# Patient Record
(demographics unavailable — no encounter records)

---

## 2019-01-01 NOTE — DSES
DATE OF ADMISSION:  2019

DATE OF DISCHARGE:  2019

 

DISCHARGE DIAGNOSIS:  Full-term girl.

 

HISTORY:  Skinny Liu is a full-term, according to gestational age, baby girl born

by spontaneous vaginal delivery to a 35-year-old mother  2, para 1.

Maternal blood type was B+.  Culture for group B strep were negative.  Serology

for syphilis and hepatitis B were both negative.  There was no maternal history

of herpes.  Membranes were ruptured for 11 hours.  Amniotic fluid was stained

with meconium.  Delivery was otherwise uneventful.  Apgar were 8 an d9.

 

PHYSICAL EXAMINATION:

Birth weight 3000 grams which is 6 pounds 10 ounces.  Head circumference 33 cm,

length 19-1/2 inches.

General Appearance: Alert and responsive, no apparent distress.

Skin:  Well perfused with no rash.

HEENT: Normocephalic.  Anterior fontanelle open and flat.  Eyes were normal with

bilateral red reflex.  No cleft palate.

Neck:  Supple.  No masses.

No thoracic deformities.  Good air entry in both lungs.  No rales.

Heart:  Sounds are rhythmic.  No murmurs.  S1, S2 were normal.

Abdomen:  Soft.  No masses.  No distension.  Normal peristalsis.

Genitalia:  Normal female.

Spine:  Straight.

Hip examination was normal.  Full range of motion in all extremities.  Femoral

pulses were present and symmetrical reflexes were physiologic.

Anus patent.

There were no gross abnormalities.

 

HOSPITAL COURSE:  Skinny Liu did well throughout her nursery stay.  On 2019

her weight was 2846 grams.  Transcutaneous bilirubin at 42 hours of life was 6.8.

She was nursing well, alert, responsive, no distress.  She was perfused with no

jaundice and a normal physical examination.

 

DISPOSITION:  Skinny Liu is being discharged home on 2019 with a followup

appointment in 72 hours.